# Patient Record
Sex: MALE | URBAN - METROPOLITAN AREA
[De-identification: names, ages, dates, MRNs, and addresses within clinical notes are randomized per-mention and may not be internally consistent; named-entity substitution may affect disease eponyms.]

---

## 2019-12-09 ENCOUNTER — TELEPHONE (OUTPATIENT)
Dept: PAIN MEDICINE | Facility: CLINIC | Age: 59
End: 2019-12-09

## 2019-12-09 NOTE — TELEPHONE ENCOUNTER
Patient called and said nurse disconnected call  Pt requested corporate's number to complain about our office  Soft Transferred patient to Cassie Patterson

## 2019-12-09 NOTE — TELEPHONE ENCOUNTER
S/w pt, stated that he has been advised by Spine and pain that dr Pascual Paniagua does not have anything to offer and the pt should return to his previous pain management doctor for pain medication  Advised pt, the writer is a nurse, there is no medical information available and therefore, the writer cannot provide any information or insight  Will relay his his concerns to Dr Cyndie Escalante and if there is anything additional, this office will cb to advise  Per pt, that is not good enough  Pt stated "there are thousands of pain medications on the market, there is SOMETHING to offer " Advised pt, generally speaking, a pt is referred to spine and pain and records are provided  Dr Cyndie Escalante reviews the records and if there is something our office can do to help, we will proceed with scheduling an evaluation  Advised pt, based on what has been discussed, Dr Pascual Paniagua does not feel there is anything more that this office could do for you  Pt stated "that is not good enough  You know, i've been on hold listening to your recording tell me how easy it is to deal with [employer] and that is not the case  I'm getting the run around " Advised pt again, the writer will relay his message to Dr Cyndie Escalante  Pt stated again, "that's not good enough, I want to talk to a manager " advised pt, the manager is not at her desk at the moment as she just left my desk  Will relay the pt's concerns to the manager as well  Pt stated, "she won't call back " Advised pt, The writer cannot comment on how this will be handled at this point, but I will relay the message  Advised the pt, as there is no additonal info, the writer will be ending the call  Pt stated that he will call back  Pt requested the main number  Provided [phone], pt stated that he wanted the main number to [employer]  Advised pt, the writer cannot provide that - I don't know it  Pt stated that isn't good enough, demanded to be transferred  Advised pt, the writer is ending the call   Call was ended by the writer

## 2019-12-09 NOTE — TELEPHONE ENCOUNTER
Patient is not established  He is requesting a call from Dr Pricila Yancey nurse  He would like an explanation of Dr Pricila Yancey response to current intake from Dr Tobie Brunner who stated "He can seek his previous pain specialist for meds " Pt states "I dont know how many times I have to repeat myself, I am seeking a different opinion and I do not want go back to [facility] " Pt expressed he is not happy with the current physician at [facility] and is not looking for opioid      c/b# [phone]

## 2023-07-10 ENCOUNTER — TELEPHONE (OUTPATIENT)
Dept: BARIATRICS | Facility: CLINIC | Age: 63
End: 2023-07-10

## 2025-01-24 ENCOUNTER — OFFICE VISIT (OUTPATIENT)
Age: 65
End: 2025-01-24
Payer: COMMERCIAL

## 2025-01-24 DIAGNOSIS — M54.16 BILATERAL LUMBAR RADICULOPATHY: Primary | ICD-10-CM

## 2025-01-24 DIAGNOSIS — M43.17 SPONDYLOLISTHESIS AT L5-S1 LEVEL: ICD-10-CM

## 2025-01-24 DIAGNOSIS — M21.371 BILATERAL FOOT-DROP: ICD-10-CM

## 2025-01-24 DIAGNOSIS — M21.372 BILATERAL FOOT-DROP: ICD-10-CM

## 2025-01-24 PROBLEM — M51.362 DEGENERATION OF INTERVERTEBRAL DISC OF LUMBAR REGION WITH DISCOGENIC BACK PAIN AND LOWER EXTREMITY PAIN: Status: ACTIVE | Noted: 2025-01-24

## 2025-01-24 PROCEDURE — 99203 OFFICE O/P NEW LOW 30 MIN: CPT | Performed by: PHYSICIAN ASSISTANT

## 2025-01-24 RX ORDER — PREDNISONE 10 MG/1
TABLET ORAL
Qty: 42 TABLET | Refills: 0 | Status: SHIPPED | OUTPATIENT
Start: 2025-01-24

## 2025-01-24 NOTE — PROGRESS NOTES
Patient Name:  Demar Hodges  MRN:  [id number]    Assessment & Plan     Bilateral lumbar radiculopathy in the setting of grade 2 spondylolisthesis of L5-S1 with chronic low back and lower extremity pain.  Chronic bilateral foot drop.  No red flags appreciated examination today.  Patient has been dealing with chronic low back pain and radicular symptoms have been worsening over the past few years.  Due to his weakness on examination today MRI of the lumbar spine will be obtained for further evaluation.  Follow-up after MRI with pain management.  Referral placed today.  In the meantime prescription for prednisone taper provided today to help with some pain control.  Recommend Tylenol as well.  Patient is unable to take NSAIDs due to taking Coumadin.  AFO brace is ordered today for the bilateral foot drop.  I did reach out to one of the brace failures with [facility] to contact this patient to set up a time to discuss the AFO braces.    Chief Complaint     Low back pain    History of the Present Illness     Demar Hodges is a 64 y.o. male who reports to the office today for evaluation of his lumbar spine.  He notes chronic lumbar spine pain ongoing for the past 30 years.  He denies any specific injury or trauma but states he did participate in [employer] in his youth which she believes led to his symptoms.  He notes chronic axial lumbar spine pain with radiation distally along the bilateral lower extremities into the toes.  He notes associated numbness and tingling as well.  Pain is 10+ out of 10 in intensity and worse with increased activity.  Pain limits his ability to ambulate.  He notes improved pain with leaning forward using a shopping cart.  He does take Tylenol on occasion without improvement.  He is unable to take NSAIDs due to taking Coumadin.  He has had prednisone in the past with transient improvement.  He has tried therapy in the past which he states exacerbated his pain.  He has seen [facility]  management who attempted an injection without improvement.  He denies any bowel or bladder dysfunction.  No saddle paresthesias.  He also notes chronic foot drop that has been present for the past 5 years.    Physical Exam     There were no vitals taken for this visit.    Lumbar spine: No gross deformity.  No tenderness midline and paraspinal musculature bilaterally.  No tenderness bilateral SI joints and bilateral piriformis musculature.  Foot drop evident bilaterally.  5 out of 5 strength appreciated with knee extension and plantarflexion.  Sensation intact but diminished in the S1 and L5 nerve root distributions bilaterally.  Sensation intact in remaining nerve distributions.  Negative straight leg raise bilaterally.  Negative JESSE test bilaterally.    Eyes: Anicteric sclerae.  ENT: Trachea midline.  Lungs: Normal respiratory effort.  CV: Capillary refill is less than 2 seconds.  Skin: Intact without erythema.  Lymph: No palpable lymphadenopathy.  Neuro: Sensation is grossly intact to light touch.  Psych: Mood and affect are appropriate.    Data Review     I have personally reviewed pertinent films in PACS, and my interpretation follows:    X-rays lumbar spine performed at an outside facility 10/8/2020 available by report only reveals grade 2 anterior listhesis of L5 on S1 with bilateral pars defects and near complete obliteration of the disc space at L5-S1 corresponding with bilateral L5-S1 foraminal stenosis.    Patient declined updated radiographs today due to concern about cost.    History reviewed. No pertinent past medical history.    History reviewed. No pertinent surgical history.    Not on File    No current outpatient medications on file prior to visit.     No current facility-administered medications on file prior to visit.            History reviewed. No pertinent family history.    Review of Systems     As stated in the HPI. All other systems reviewed and are negative.

## 2025-01-27 ENCOUNTER — TELEPHONE (OUTPATIENT)
Age: 65
End: 2025-01-27

## 2025-01-27 NOTE — PROGRESS NOTES
1/27/25  Pt. Was called regarding AFO, agreed to send to insurance for authorization. Will follow up with pt. On OOP.

## 2025-01-27 NOTE — TELEPHONE ENCOUNTER
Caller: Self    Doctor: Red    Reason for call: Patient has 4 free rides and wants to make sure those include to MRI and to back injection. Advised patient he would need to consult with [facility] after MRI determine treatment options. And before any ride can be set up, he would need to be scheduled for these appts.     Patient wants to know if consult with [facility] can be a virtual to save one of his rides. Made patient aware typically consult appts are in person but will send a message to find out.    Offered to transfer patient to central scheduling to at least get MRI scheduled. Patient stated his  Love from [facility] will be handling everything.    Call back#: [phone]

## 2025-02-14 ENCOUNTER — HOSPITAL ENCOUNTER (OUTPATIENT)
Facility: MEDICAL CENTER | Age: 65
Discharge: HOME | End: 2025-02-14

## 2025-02-14 DIAGNOSIS — M43.17 SPONDYLOLISTHESIS AT L5-S1 LEVEL: ICD-10-CM

## 2025-02-14 DIAGNOSIS — M21.371 BILATERAL FOOT-DROP: ICD-10-CM

## 2025-02-14 DIAGNOSIS — M54.16 BILATERAL LUMBAR RADICULOPATHY: ICD-10-CM

## 2025-02-14 DIAGNOSIS — M21.372 BILATERAL FOOT-DROP: ICD-10-CM

## 2025-02-18 NOTE — TELEPHONE ENCOUNTER
Caller: Demar     Doctor: Dr. Christopher Moon    Reason for call: magnet was too powerful, could not do MRI.     1.5 MRI  Machine Needs this machine due to He has a metal device Implanted    He is terrible pain. He needs his MRI. He needs lower power machine. He has a metal device Implanted    He went for his MRI that was scheduled and they would not do it on 2/14    He is asking if you could call to discuss with him .  Thank you     Call back#: [phone]

## 2025-03-04 ENCOUNTER — HOSPITAL ENCOUNTER (OUTPATIENT)
Dept: MRI IMAGING | Facility: HOSPITAL | Age: 65
Discharge: HOME | End: 2025-03-04
Payer: COMMERCIAL

## 2025-03-04 DIAGNOSIS — M54.16 BILATERAL LUMBAR RADICULOPATHY: ICD-10-CM

## 2025-03-04 DIAGNOSIS — M43.17 SPONDYLOLISTHESIS AT L5-S1 LEVEL: ICD-10-CM

## 2025-03-04 DIAGNOSIS — M21.372 BILATERAL FOOT-DROP: ICD-10-CM

## 2025-03-04 DIAGNOSIS — M21.371 BILATERAL FOOT-DROP: ICD-10-CM

## 2025-03-04 PROCEDURE — 72148 MRI LUMBAR SPINE W/O DYE: CPT

## 2025-03-06 ENCOUNTER — RESULTS FOLLOW-UP (OUTPATIENT)
Age: 65
End: 2025-03-06

## 2025-03-06 ENCOUNTER — TELEPHONE (OUTPATIENT)
Dept: OBGYN CLINIC | Facility: CLINIC | Age: 65
End: 2025-03-06

## 2025-03-06 ENCOUNTER — TELEPHONE (OUTPATIENT)
Age: 65
End: 2025-03-06

## 2025-03-06 NOTE — TELEPHONE ENCOUNTER
Called and spoke w/pt and relayed SIIDRO Moon's msg and pt states that he would like to see Pain and Spine first before making an appointment with Dr Schulte, Ortho Spine Surgeon. Offered to give number for [facility] Sp[facility] or transfer call and pt states he is in bed so not able to take number. Will forward encounter to Pain and Spine to call pt to schedule. Pt states that he appreciated my letting Christopher know that he was in pain and he apologized for his terseness.  He said that he could tell that Christopher and I genuinely cared. I let him know that he should receive a call from Pain and Spine to set up appt for injection. If that did not work, he could call back for appt w/Dr Schulte. If he has any other questions or concerns, please call back.  Pt states he understands.

## 2025-03-06 NOTE — TELEPHONE ENCOUNTER
----- Message from Christopher Moon PA-C sent at 3/6/2025  8:33 AM EST -----  Patient should see pain mgmt to review MRI for further treatment recs  ----- Message -----  From: Interface, Radiology Results In  Sent: 3/5/2025   5:48 PM EST  To: Christopher Moon PA-C

## 2025-03-06 NOTE — TELEPHONE ENCOUNTER
Called and spoke w/pt and relayed ISIDRO Moon's msg. Offered to give pt Pain [facility] Spine's number to scheduled and he states he does not have pen and paper. Offered to transfer call to Pain & Spine to schedule appt and he declined.  Pt  states he only has one ride left w/insurance so he wants to know if he can get the consult and treatment in one visit? Advised that can be asked when making appointment and pt is unhappy. States he needs surgery. Asked if he would like a referral to an Ortho Spine Surgeon? Started to use not nice language. Wants Christopher to know that it is time to stop playing games and to get him fixed or he will not be here long. He is tired of being patched like a bire before needing a new tire. Injections are fine for short time but pt needs to be fixed. States he wants to be treated like they would want to be treated. Fell like pain management is an oxymoron and they will not manage his pain.  He has been dealing w/his pain for 20 years. I empathized w/pt that I was sorry he was having pain several times. He said, he knew I was sorry. Tell Christopher he needs to be fixed. He would like a call from Christopher. Pt disconnected call.

## 2025-03-06 NOTE — TELEPHONE ENCOUNTER
Caller: sandra Benz    Doctor: Dr. LOAIZA    Reason for call: pt called to schedule an appt with [facility].  Pt stated he only has one transportation ride available thru his health insurance.  Pt is asking if the dr could possibly do the consult appt and if he needs a procedure, pt is asking if the dr can do the procedure the same day?    Pt has no other means of transportation.    Call back#: [phone]

## 2025-03-06 NOTE — TELEPHONE ENCOUNTER
----- Message from Christopher Moon PA-C sent at 3/6/2025  9:58 AM EST -----  See other task.  Spoke with patient on the phone.  He is interested in seeing an orthopedic spine surgeon as well as pain management.  Patient was happy with the call.  ----- Message -----  From: Stormy Rizvi RN  Sent: 3/6/2025   9:02 AM EST  To: Christopher Moon PA-C; #    ----- Message from Stormy Rizvi RN sent at 3/6/2025  9:02 AM EST -----  SP Referral VS Ortho Spine Referral

## 2025-03-06 NOTE — TELEPHONE ENCOUNTER
LVM for patient to please return my call. Unfortunatley patient would not be able to have a consult and injection in the same day. These type of injections need to be authorized by the insurance, and then get scheduled . I will see if the patient would still like to come in for a consult once he returns my call.

## 2025-03-06 NOTE — TELEPHONE ENCOUNTER
Patient called back. I explained to the patient that he would not be able to have an injection and a consult in the same day. Patient stated that he is in excruciating pain, and has only been able to sleep for two hours per night the past 10 years. Patient told me that the amount of pain he is in is going to result in him dying very soon. He said that the whole healthcare system is failing him and he wants answers. I told the patient that I will try my best to help, and will attempt to find  a solution.

## 2025-03-06 NOTE — TELEPHONE ENCOUNTER
Caller: sandra Benz     Doctor: Dr. LOAIZA     Reason for call: pt called to schedule an appt with [facility].  Pt stated he only has one transportation ride available thru his health insurance.  Pt is asking if the dr could possibly do the consult appt and if he needs a procedure, pt is asking if the dr can do the procedure the same day?     Pt has no other means of transportation.    Pt is pleading to get some pain relief from his pain     Call back#: [phone]       Email to JOSE

## 2025-03-06 NOTE — TELEPHONE ENCOUNTER
Received an email from [facility] stating patient requesting to speak with me.  Called patient back and left message to return my call.

## 2025-03-10 ENCOUNTER — TELEPHONE (OUTPATIENT)
Age: 65
End: 2025-03-10

## 2025-03-10 NOTE — TELEPHONE ENCOUNTER
Spoke with patient and told him that the consult and procedure would have to be done on different days. I told patient that the staff is unable to come out to his car, get him out of his car, and push him in for an appt. I told him that his  at [facility] is looking at getting him additional rides. I told patient that I would call him back, once I got an update. Patient was upset and cursing at me throughout the entirety of the call.

## 2025-03-10 NOTE — TELEPHONE ENCOUNTER
Attempted to reach pt, LMOM with CB# and OH.    Pt is not established with out office. Pt would need to be seen on consult first and be evaluated to determine a plan of care moving forward.

## 2025-03-10 NOTE — TELEPHONE ENCOUNTER
Caller: CRISTIAN Sarabia     Doctor: Dr. Ortiz    Reason for call: Called to clarify if patient needs to see ordering provider to review MRI or another provider. Advised that per the PA, patient should see [facility] for consult and review of MRI. She stated he only has one ride left with the insurance since he used 2 for the MRI-one was scheduled at the wrong location(needed larger machine). I asked if they could give him back one of his rides since we scheduled at the wrong location/machine. She will check with her supervisor and call the patient back. Also, gave her the phone number to [facility] to contact them for scheduling appt for the patient    Call back#: [phone]

## 2025-03-10 NOTE — TELEPHONE ENCOUNTER
Caller: pt    Doctor: Dr. patricio    Reason for call: I read pt the msg from the rn and pt hung up on me.    Call back#: [phone]

## 2025-03-10 NOTE — TELEPHONE ENCOUNTER
Caller: Pt    Doctor: Dr. Ortiz    Reason for call: Pt called in to book appt. Pt asking for assistance being removed from his vehicle to get in his wheelchair for consult procedure. Pt proceed to get angry about policies in place with [facility] department. Tried to accommodate pt as best as possible however he became irate and eventually had to end the call.    Reached out to [facility] and  about it to have call pulled.     Call back#: [phone]

## 2025-03-10 NOTE — TELEPHONE ENCOUNTER
Caller: No Nurse case management     Doctor: Dr. Ortiz    Reason for call: No calling asking for update regarding message below please advise     Call back#: [phone]

## 2025-03-10 NOTE — TELEPHONE ENCOUNTER
Caller: sandra Benz    Doctor: Dr. Ortiz    Reason for call: pt called back to schedule an OV with Dr. Najera.   I was able to get the pt in on 3/18 with Dr. Najera.  After scheduling the appt I gave the pt the address.  He in turn stated that is too far for someone with spine issues to drive.  Pt also mentioned that the office staff would not come out to his car to assist him with getting into the appt even if he did get there.  I told pt that I would think it's because of liability for him and for the staff.  If someone would get hurt it is a safety concern.  Pt continued with “you people” don't care about pt's and I'm sitting with pain.  There was more to the conversation of me using the word “unfortunately” and I received a definition of the word.   Bottom line he is looking to speak with someone above [facility] about all of this    Call back#: [phone]     Email has been sent to Yashira MARQUEZ

## 2025-03-11 ENCOUNTER — TELEPHONE (OUTPATIENT)
Age: 65
End: 2025-03-11

## 2025-03-11 NOTE — TELEPHONE ENCOUNTER
Caller: Patient- Demar    Doctor: Christopher PARKER     Reason for call: Patient is calling in wanting to speak with the PA directly as he stated that the referral  he was given to [facility] that they decided that they are not taking him on as a patient and he needs answers. The patient is very upset since he completed the MRI and everything that was needed and is still in excruciating pain, please reach out to assist further as soon as possible.      Call back#: [phone]

## 2025-03-11 NOTE — TELEPHONE ENCOUNTER
Caller: Patient    Doctor and/or Office: Dr. Ortiz    #: [phone]     Escalation: Appointment      EMAIL sent to [facility] regarding call

## 2025-03-11 NOTE — TELEPHONE ENCOUNTER
According to the correspondence that I see with Pain management, they are willing to see the patient but they cannot perform an injection at his first visit. They perform a consultation and if an injection is recommended they will schedule the injection and get insurance authorization

## 2025-03-11 NOTE — TELEPHONE ENCOUNTER
I spoke with this patient on the phone.  He did schedule an appointment with pain management and asked that they come out to the car with a wheelchair to help him get into the building.  He states that pain management was unable to do so and patient is extremely frustrated about the situation.  He feels like no one is helping him.  He was hopeful that an injection could be performed and lead to some improvement in his pain.  He previously took a prednisone taper which did help his symptoms transiently.  Patient was hoping to get some help to be able to get into the building to see pain management.  I stated I would escalate this to my manager to see what we could do to get him some help.  Patient was very appreciative and is hoping for a return call.

## 2025-03-11 NOTE — TELEPHONE ENCOUNTER
Caller: Patient     Doctor:  Red     Reason for call: Patient called again to ask to speak to Christopher. I advised him that he was at rounds at the hospital this morning and will be in the office this afternoon.  Please call patient to discuss treatment ASAP.    Call back#: [phone]

## 2025-03-13 NOTE — TELEPHONE ENCOUNTER
Received email from [facility] stating patient requesting to speak with a manager.  I called patient back.  He states Christopher referred him to [facility] and they are denying him care.  Patients states he is in excruciating pain and cannot walk from his car to the office to be seen.  He was also referred to surgeon, but thinks injections will help and does not want to see the surgeon.  I advised patient I would speak with Christopher and [facility] and would follow up with him.

## 2025-03-13 NOTE — TELEPHONE ENCOUNTER
Caller: Patient     Doctor: Dr. Moon     Reason for call: Patient calling for update on previous call re: assistance getting from the car to the building.  Please call patient to advise    Call back#: [phone]

## 2025-03-17 NOTE — TELEPHONE ENCOUNTER
Patient calling to follow up on appointment being made. Patient states he is in a lot of pain and wants to be scheduled.

## 2025-03-18 NOTE — TELEPHONE ENCOUNTER
Caller: Patient     Doctor: Dr. Moon     Reason for call: Asked for status, Patient is in a lot of pain     Call back#: [phone]

## 2025-03-27 ENCOUNTER — TELEPHONE (OUTPATIENT)
Age: 65
End: 2025-03-27

## 2025-03-27 NOTE — TELEPHONE ENCOUNTER
"   Caller: Arleth Lugo     Doctor: Dr. Moon     Reason for call: Calling back again concerning below.     Questioned why the original mri on 2/14 was cancelled. I I advised again what Annia advised in original Message: Provided \"Patient (Pt. Needs to be scheduled to a wide bore 1.5T magnet due to filter and body habitus). Advised completed 3/4/25.  She had more question so I gave her phone number to MRI facility [phone]      Call back#: [phone]        "

## 2025-03-27 NOTE — TELEPHONE ENCOUNTER
"Caller: Arleth Lugo    Doctor: Dr. Moon    Reason for call: Questioned why the original mri on 2/14 was cancelled. Provided \"Patient (Pt. Needs to be scheduled to a wide bore 1.5T magnet due to filter and body habitus). Advised completed 3/4/25     Call back#: [phone]  "

## 2025-03-31 NOTE — TELEPHONE ENCOUNTER
Caller: Ayana @ [employer]      Doctor: Christopher Moon    Reason for call: Ayana called to inform us she will be faxing over the form that is requests the Medical Records (office notes)    Patient has a VinaCava Filter , which may not have been documented , as then he cannot have an MRI    Call back#: [phone]  Fax # [phone]